# Patient Record
Sex: MALE | Race: WHITE
[De-identification: names, ages, dates, MRNs, and addresses within clinical notes are randomized per-mention and may not be internally consistent; named-entity substitution may affect disease eponyms.]

---

## 2020-02-05 ENCOUNTER — HOSPITAL ENCOUNTER (EMERGENCY)
Dept: HOSPITAL 93 - ER | Age: 20
LOS: 1 days | Discharge: HOME | End: 2020-02-06
Payer: COMMERCIAL

## 2020-02-05 VITALS — HEIGHT: 67 IN | WEIGHT: 250 LBS | BODY MASS INDEX: 39.24 KG/M2

## 2020-02-05 DIAGNOSIS — R05: ICD-10-CM

## 2020-02-05 DIAGNOSIS — R06.02: ICD-10-CM

## 2020-02-05 DIAGNOSIS — B34.9: Primary | ICD-10-CM

## 2020-10-18 ENCOUNTER — HOSPITAL ENCOUNTER (INPATIENT)
Dept: HOSPITAL 93 - ER | Age: 20
LOS: 4 days | Discharge: HOME | DRG: 179 | End: 2020-10-22
Attending: SPECIALIST/TECHNOLOGIST, OTHER | Admitting: SPECIALIST/TECHNOLOGIST, OTHER
Payer: COMMERCIAL

## 2020-10-18 VITALS — HEIGHT: 67 IN | WEIGHT: 248 LBS | BODY MASS INDEX: 38.92 KG/M2

## 2020-10-18 DIAGNOSIS — R51.9: ICD-10-CM

## 2020-10-18 DIAGNOSIS — U07.1: Primary | ICD-10-CM

## 2020-10-18 DIAGNOSIS — K75.89: ICD-10-CM

## 2020-10-18 DIAGNOSIS — R16.0: ICD-10-CM

## 2020-10-18 DIAGNOSIS — R50.9: ICD-10-CM

## 2020-10-18 DIAGNOSIS — D69.59: ICD-10-CM

## 2020-10-18 PROCEDURE — BW40ZZZ ULTRASONOGRAPHY OF ABDOMEN: ICD-10-PCS | Performed by: RADIOLOGY

## 2020-10-18 PROCEDURE — 3E0F7SF INTRODUCTION OF OTHER GAS INTO RESPIRATORY TRACT, VIA NATURAL OR ARTIFICIAL OPENING: ICD-10-PCS | Performed by: SPECIALIST/TECHNOLOGIST, OTHER

## 2020-10-18 PROCEDURE — CB2YYZZ TOMOGRAPHIC (TOMO) NUCLEAR MEDICINE IMAGING OF RESPIRATORY SYSTEM USING OTHER RADIONUCLIDE: ICD-10-PCS | Performed by: SPECIALIST/TECHNOLOGIST, OTHER

## 2020-10-18 PROCEDURE — 4A033R1 MEASUREMENT OF ARTERIAL SATURATION, PERIPHERAL, PERCUTANEOUS APPROACH: ICD-10-PCS | Performed by: SPECIALIST/TECHNOLOGIST, OTHER

## 2020-10-18 NOTE — NUR
PT ALERTA Y ORIENTADO X3 ESFERAS. PT REFIERE DOLOR DE LIZA Y FIEBRE DESDE EL
PASADO SABADO. NIEGA CONTACTO CON ALGUN FAMILIAR/CONOCIDO CON EL COVID19
POSITIVO. PT INDICA VICKIE TENIDO FIEBRE ANTES DE LLEGAR A ER Y SE JEZ DOS
TABLETAS DE PANADOL.
SE PRESENTA ENOC A NIKI JORGE. SE ENTREGA DAQUAN N95 AL PT.

## 2020-10-18 NOTE — NUR
SE RECIBE MASCULINO ALERTA Y ORIENTADO POR JACKIE ESFERAS. SE ORIENTA A PACIENTE
SOBRE TRATAMIENTO. SE SERG MUESTRAS DE LABORATORIO ORDENADAS. SE CNALIZA CON
AREA DE VENOPUNCION DEMETRIA DE EDEMA O ENROJECIMIENTO. SE ADMINISTRAN
MEDICAMENTOS ORDENADOS. SE MANTIENE EN OBSERVACION POR CAMBIOS.

## 2020-10-19 PROCEDURE — BW21YZZ COMPUTERIZED TOMOGRAPHY (CT SCAN) OF ABDOMEN AND PELVIS USING OTHER CONTRAST: ICD-10-PCS | Performed by: RADIOLOGY

## 2020-10-19 PROCEDURE — 8E0ZXY6 ISOLATION: ICD-10-PCS | Performed by: SPECIALIST/TECHNOLOGIST, OTHER

## 2023-08-15 ENCOUNTER — HOSPITAL ENCOUNTER (EMERGENCY)
Dept: HOSPITAL 93 - ER | Age: 23
LOS: 1 days | Discharge: HOME | End: 2023-08-16
Payer: COMMERCIAL

## 2023-08-15 VITALS — HEIGHT: 69 IN | WEIGHT: 210 LBS | BODY MASS INDEX: 31.1 KG/M2

## 2023-08-15 DIAGNOSIS — E66.8: ICD-10-CM

## 2023-08-15 DIAGNOSIS — I48.91: Primary | ICD-10-CM

## 2024-12-02 ENCOUNTER — HOSPITAL ENCOUNTER (EMERGENCY)
Dept: HOSPITAL 93 - ER | Age: 24
LOS: 1 days | Discharge: HOME | End: 2024-12-03
Payer: COMMERCIAL

## 2024-12-02 VITALS — BODY MASS INDEX: 41.78 KG/M2 | WEIGHT: 260 LBS | HEIGHT: 66 IN

## 2024-12-02 DIAGNOSIS — J03.90: ICD-10-CM

## 2024-12-02 DIAGNOSIS — R53.81: Primary | ICD-10-CM
